# Patient Record
(demographics unavailable — no encounter records)

---

## 2024-12-03 NOTE — REVIEW OF SYSTEMS
[Date of last menstrual period ____] : date of last menstrual period: [unfilled] [Seen by urologist before (Name)  ___] : Previously seen by a urologist: [unfilled] [Urine Infection (bladder/kidney)] : bladder/kidney infection [Wake up at night to urinate  How many times?  ___] : wakes up to urinate [unfilled] times during the night [Negative] : Heme/Lymph

## 2024-12-03 NOTE — ASSESSMENT
[FreeTextEntry1] : 68-year-old female who presents for:  1.  Symptoms of UTI-we will send the patient's urinalysis and urine culture today.  I offered to send Pyridium but her symptoms are manageable currently.  We will hold off on antibiotics until the final culture has resulted.  I did discuss that if this is positive, this would be considered recurrent UTI.  Who started her on UTI prevention measures including 1.5 L of water per day, cranberry tablets, probiotic, vaginal estrogen cream.  2.  Negative cytology-patient with a negative cytology but mention of a differential including mechanical disruption and papillary urothelial lesion.  She has no gross hematuria or microscopic hematuria.  She was into urology for this previously in 2021.  We discussed that there is no clear indication for further workup.  However if she is interested we can send her for CT urogram and perform cystoscopy.  The order for CT has been placed for her.  Given that she is having UTI symptoms, we opted not to send urine cytology today.  We can also repeat this after her urine culture has resulted and she has been treated for UTI.  Follow-up results of the urine studies